# Patient Record
Sex: FEMALE | Race: WHITE | NOT HISPANIC OR LATINO | URBAN - METROPOLITAN AREA
[De-identification: names, ages, dates, MRNs, and addresses within clinical notes are randomized per-mention and may not be internally consistent; named-entity substitution may affect disease eponyms.]

---

## 2024-01-04 ENCOUNTER — HOSPITAL ENCOUNTER (OUTPATIENT)
Dept: RADIOLOGY | Facility: HOSPITAL | Age: 44
Discharge: HOME | End: 2024-01-04
Payer: COMMERCIAL

## 2024-01-04 VITALS — HEIGHT: 63 IN | WEIGHT: 130 LBS | BODY MASS INDEX: 23.04 KG/M2

## 2024-01-04 DIAGNOSIS — Z12.31 ENCOUNTER FOR SCREENING MAMMOGRAM FOR MALIGNANT NEOPLASM OF BREAST: ICD-10-CM

## 2024-01-04 PROCEDURE — 77063 BREAST TOMOSYNTHESIS BI: CPT

## 2025-04-01 PROCEDURE — 87624 HPV HI-RISK TYP POOLED RSLT: CPT

## 2025-04-01 PROCEDURE — 88175 CYTOPATH C/V AUTO FLUID REDO: CPT

## 2025-04-03 ENCOUNTER — LAB REQUISITION (OUTPATIENT)
Dept: LAB | Facility: HOSPITAL | Age: 45
End: 2025-04-03
Payer: COMMERCIAL

## 2025-04-03 DIAGNOSIS — Z11.51 ENCOUNTER FOR SCREENING FOR HUMAN PAPILLOMAVIRUS (HPV): ICD-10-CM

## 2025-04-03 DIAGNOSIS — Z12.4 ENCOUNTER FOR SCREENING FOR MALIGNANT NEOPLASM OF CERVIX: ICD-10-CM

## 2025-04-03 DIAGNOSIS — Z01.419 ENCOUNTER FOR GYNECOLOGICAL EXAMINATION (GENERAL) (ROUTINE) WITHOUT ABNORMAL FINDINGS: ICD-10-CM

## 2025-07-07 ENCOUNTER — APPOINTMENT (OUTPATIENT)
Dept: OTOLARYNGOLOGY | Facility: CLINIC | Age: 45
End: 2025-07-07
Payer: COMMERCIAL

## 2025-07-07 VITALS — BODY MASS INDEX: 27.11 KG/M2 | HEIGHT: 63 IN | WEIGHT: 153 LBS | TEMPERATURE: 97.5 F

## 2025-07-07 DIAGNOSIS — J33.9 NASAL POLYPOSIS: ICD-10-CM

## 2025-07-07 DIAGNOSIS — D75.839 THROMBOCYTOSIS: ICD-10-CM

## 2025-07-07 DIAGNOSIS — J30.9 ALLERGIC RHINITIS, UNSPECIFIED SEASONALITY, UNSPECIFIED TRIGGER: ICD-10-CM

## 2025-07-07 DIAGNOSIS — R59.0 CERVICAL LYMPHADENOPATHY: Primary | ICD-10-CM

## 2025-07-07 PROCEDURE — 31575 DIAGNOSTIC LARYNGOSCOPY: CPT | Performed by: OTOLARYNGOLOGY

## 2025-07-07 PROCEDURE — 3008F BODY MASS INDEX DOCD: CPT | Performed by: OTOLARYNGOLOGY

## 2025-07-07 PROCEDURE — 1036F TOBACCO NON-USER: CPT | Performed by: OTOLARYNGOLOGY

## 2025-07-07 PROCEDURE — 99204 OFFICE O/P NEW MOD 45 MIN: CPT | Performed by: OTOLARYNGOLOGY

## 2025-07-07 RX ORDER — DROSPIRENONE AND ETHINYL ESTRADIOL 0.03MG-3MG
KIT ORAL
COMMUNITY

## 2025-07-07 RX ORDER — LEVOTHYROXINE SODIUM 100 UG/1
TABLET ORAL
COMMUNITY

## 2025-07-07 RX ORDER — METFORMIN HYDROCHLORIDE 500 MG/1
500 TABLET ORAL
COMMUNITY

## 2025-07-07 NOTE — PROGRESS NOTES
Chief Complaint   Patient presents with    New Patient Visit     NP ENLARGED LYMPHNODES NECK, U/S IN CHART      Date of Evaluation: 7/7/2025   Kristine was seen today for new patient visit.  Diagnoses and all orders for this visit:  Cervical lymphadenopathy (Primary)  -     CT soft tissue neck w IV contrast; Future  -     Creatinine, Serum; Future  -     Creatinine, Serum  Allergic rhinitis, unspecified seasonality, unspecified trigger  Nasal polyposis  Thrombocytosis       PLAN  Assessment & Plan  1. Cervical lymphadenopathy.  The cervical lymph nodes are mildly enlarged, with the right side being larger than the left. This has been ongoing for at least 2 years. The differential diagnosis includes lymphoma, but this is considered low given the stability over 2 years. Other potential causes include infection or low-grade disease. A CT scan of the neck will be ordered to provide better definition and allow for comparison with the previous ultrasound. If necessary, a biopsy may be considered based on the CT scan results.    2. Allergic rhinitis.  A small polyp was noted in the right nasal cavity, likely an allergic polyp. The patient is advised to use Nasonex (mometasone) 2 sprays in each nostril once a day instead of Flonase to help shrink the polyp and manage her symptoms. The CT scan of the neck will also provide imaging of the sinuses to further evaluate the polyp.    3. Thrombocytosis.  The patient has a high platelet count, which is being monitored by her hematologist. The cause of the elevated platelet count is still under investigation. The CT scan of the neck will help rule out any potential causes related to the cervical lymph nodes.    4. Iron deficiency anemia.  The patient has low iron levels and is under the care of a hematologist. She is scheduled for another iron infusion in 3 months. The anemia has improved but is not completely resolved.    5. Hashimoto's thyroiditis.  The patient has a history of  Hashimoto's thyroiditis.    6. Pernicious anemia.  The patient has pernicious anemia and takes B12 injections.    Risks, benefits, and alternatives of treatment were discussed. The CT scan will provide better definition of the cervical lymph nodes and sinuses, allowing for comparison with the previous ultrasound. If necessary, a biopsy may be considered based on the CT scan results. Switching from Flonase to Nasonex may help manage allergic rhinitis symptoms and shrink the nasal polyp without affecting the sense of taste and smell. The patient will follow up in 1 month to reevaluate and discuss the CT scan results.     Follow-up  Follow up in 1 month.       HPI  History of Present Illness  The patient is a 44-year-old white female who is here for evaluation of cervical lymph nodes.    She was referred to an ENT specialist by her hematologist due to the presence of lymph nodes, with the right one being larger than the left. This condition has persisted for at least 2 years. Despite mentioning it to other doctors during previous illnesses, no action was taken until she brought it up with her hematologist, who then conducted an ultrasound and recommended an ENT consultation. She reports no regular difficulty swallowing or pain when swallowing. She experiences shortness of breath, which she attributes to her B12 deficiency. She is concerned about the possibility of lymphoma.    She has been under the care of a hematologist for nearly 2 years for low iron levels and elevated platelet count. Her anemia has improved but is not completely resolved, and she is scheduled for another iron infusion in 3 months. Her platelet count remains high, and the cause is still being investigated. An ultrasound of her spleen was also performed.    She also suffers from Hashimoto's disease and pernicious anemia, for which she receives B12 injections.    She has persistent scabbing in her nose, which she manages by applying Vaseline daily.  "She has never undergone nasal surgery. She uses Flonase during allergy season, which typically occurs in early spring and fall. She does not use it daily as it affects her sense of taste and smell after prolonged use. However, she continues to use it as it helps her breathe better and sleep more comfortably.    SOCIAL HISTORY  She does not smoke.       Last Recorded Vitals  Temperature 36.4 °C (97.5 °F), height 1.6 m (5' 3\"), weight 69.4 kg (153 lb). , Body mass index is 27.1 kg/m².  Physical Exam:  Physical Exam  Nose: Nasal cavity appears dry. A small polyp is present in the right nasal cavity.  Neck: Palpation of cervical lymph nodes reveals mildly enlarged lymph nodes in the upper part of the neck.  []General appearance: Well-developed, well-nourished in no acute distress, conversant with normal voice quality    Head/face: No erythema or edema or facial tenderness, and normal facial nerve function bilaterally    External ear: Clear external auditory canals with normal pinnae  Tube status: N/A  Middle ear: Tympanic membranes intact and mobile, middle ears normal.  Tympanic membrane perforation: N/A  Mastoid bowl: N/A  Hearing: Normal conversational awareness at normal speech thresholds    Nose visualized using: Nasal endoscopy  Nasal dorsum: Nontraumatic midline appearance  Septum: Deviated right.  Small benign appearing polyp in the right posterior middle meatus  Inferior turbinates: Normal, pink  Secretions: Dry    Oral cavity and oropharynx: Normal  Teeth: Good condition  Floor of mouth: without lesions  Palate: Normal hard palate, soft palate and uvula  Oropharynx: Clear, no lesions present  Buccal mucosa: Normal without masses or lesions  Lips: Normal    Nasopharynx: Normal on endoscopy    Larynx and hypopharynx: Flexible laryngoscopy was performed secondary to an inadequate mirror examination.  With verbal informed consent the flexible laryngoscope was passed through the nasal cavity.  The patient had a " normal epiglottis, AE folds, arytenoids, false vocal cords, true vocal cords, and normal vocal cord mobility bilaterally.  No significant mucosal masses or lesions noted l    Neck:  Salivary glands: Normal bilateral parotid and submandibular glands by inspection and palpation.  Non-thyroid masses: No palpable masses or significant lymphadenopathy  Trachea: Midline  Thyroid: No thyromegaly or palpable nodules  Temporomandibular joint: Nontender  Cervical range of motion: Normal    Neurologic exam: Alert and oriented x3, appropriate affect.  Cranial nerves II-XII normal bilaterally  Extraocular movement: Extraocular movement intact, normal gaze alignment       Results  Labs   - CBC: 2025, White blood cell count of 9.2, normal H&H, platelet count of 439    Imaging   - Neck ultrasound: 2025, Three lymph nodes near the jawline on the right upper neck measuring 1.4 cm, 0.9 cm and 2.2 cm. The nodes had prominent cortices and minuscule fat cheryl. There was also a 1.6 cm left subcutaneous lymph node.     Medical History[1]   Surgical History[2]       Medications:   Current Outpatient Medications   Medication Instructions    drospirenone-ethinyl estradioL (Cesilia, Ocella) 3-0.03 mg tablet TAKE ONE TABLET BY MOUTH ONCE A DAY  SKIP PLACEBOS.    levothyroxine (Synthroid, Levoxyl) 100 mcg tablet take one tablet by mouth daily in the morning on an empty stomach.    metFORMIN (GLUCOPHAGE) 500 mg, 2 times daily (morning and late afternoon)        Allergies:  Allergies[3]         This medical note was created with the assistance of artificial intelligence (AI) for documentation purposes. The content has been reviewed and confirmed by the healthcare provider for accuracy and completeness. Patient consented to the use of audio recording and use of AI during their visit.      Mo Smith MD       [1] History reviewed. No pertinent past medical history.  [2]   Past Surgical History:  Procedure Laterality Date      SECTION, CLASSIC  08/15/2017     Section   [3] No Known Allergies

## 2025-07-17 ENCOUNTER — HOSPITAL ENCOUNTER (OUTPATIENT)
Dept: RADIOLOGY | Facility: HOSPITAL | Age: 45
Discharge: HOME | End: 2025-07-17
Payer: COMMERCIAL

## 2025-07-17 DIAGNOSIS — R59.0 CERVICAL LYMPHADENOPATHY: ICD-10-CM

## 2025-07-17 PROCEDURE — 70491 CT SOFT TISSUE NECK W/DYE: CPT

## 2025-07-17 PROCEDURE — 2550000001 HC RX 255 CONTRASTS: Performed by: OTOLARYNGOLOGY

## 2025-07-17 RX ADMIN — IOHEXOL 75 ML: 350 INJECTION, SOLUTION INTRAVENOUS at 11:26

## 2025-08-06 ENCOUNTER — APPOINTMENT (OUTPATIENT)
Dept: OTOLARYNGOLOGY | Facility: CLINIC | Age: 45
End: 2025-08-06
Payer: COMMERCIAL

## 2025-08-18 ENCOUNTER — APPOINTMENT (OUTPATIENT)
Dept: OTOLARYNGOLOGY | Facility: CLINIC | Age: 45
End: 2025-08-18
Payer: COMMERCIAL

## 2025-08-18 VITALS — WEIGHT: 142 LBS | HEIGHT: 63 IN | BODY MASS INDEX: 25.16 KG/M2 | TEMPERATURE: 97.4 F

## 2025-08-18 DIAGNOSIS — J30.9 ALLERGIC RHINITIS, UNSPECIFIED SEASONALITY, UNSPECIFIED TRIGGER: ICD-10-CM

## 2025-08-18 DIAGNOSIS — J33.9 NASAL POLYPOSIS: ICD-10-CM

## 2025-08-18 DIAGNOSIS — D75.839 THROMBOCYTOSIS: ICD-10-CM

## 2025-08-18 DIAGNOSIS — R59.0 CERVICAL LYMPHADENOPATHY: Primary | ICD-10-CM

## 2025-08-18 PROCEDURE — 1036F TOBACCO NON-USER: CPT | Performed by: OTOLARYNGOLOGY

## 2025-08-18 PROCEDURE — 3008F BODY MASS INDEX DOCD: CPT | Performed by: OTOLARYNGOLOGY

## 2025-08-18 PROCEDURE — 99214 OFFICE O/P EST MOD 30 MIN: CPT | Performed by: OTOLARYNGOLOGY

## 2025-12-15 ENCOUNTER — APPOINTMENT (OUTPATIENT)
Dept: OTOLARYNGOLOGY | Facility: CLINIC | Age: 45
End: 2025-12-15
Payer: COMMERCIAL